# Patient Record
Sex: MALE | Employment: UNEMPLOYED | URBAN - METROPOLITAN AREA
[De-identification: names, ages, dates, MRNs, and addresses within clinical notes are randomized per-mention and may not be internally consistent; named-entity substitution may affect disease eponyms.]

---

## 2020-05-20 NOTE — PROGRESS NOTES
[facility] HIV TELECONFERENCE CLINIC NOTE     Date of Service: 5/20/2020    Referring Physician: TAYLA    Reason for Referral: Establish care for HIV, hepatitis C    Chief Complaint: HIV    History of Present Illness:     Denis Rolle is a 25 y.o. male with past medical history of IV drug abuse, asthma, HIV, here to establish care for HIV.  Patient states that he was diagnosed in 2015 when he was tested routinely in [place].  He states that he was started on Triumeq and has been on this since.  Patient feels that he likely contracted this from unprotected intercourse.  Patient has been taking Triumeq daily with no issues, no fevers or chills.    Patient has also reportedly been diagnosed with chronic hepatitis C with undetectable viral load.  No treatment attempted in the past.    Current ART: Triumeq  Prior HIV treatment: None  Resistance: Unknown  Diagnosed with HIV: 2015  Risk factors: Unprotected intercourse  HIV CD4 / viral load at start of treatment: Unknown  OIs: None known    Vaccines    There is no immunization history on file for this patient.    Pertinent Lab Results:    Date  CD4/%  HIV Viral Load  2/19/2020 302/16.8% <20  5/12/2020 314/16.5% pending    Screening:   HBV serologies:  HAV serology:  HCV Ab: positive  Serum RPR: Nonreactive 12/2019  Quant gold/PPD:  Urine GC/CT:  UA:    HCV genotype: 1a  HCV resistance:  Prior treatment status: Naïve  Risk factors: IV drug abuse  Cirrhosis: No fibrosis on fibro-sure  CTP score:  FIB-4 score: 0.75  HCV PCR at start of treatment: 5,00,000 4/3/2020  HBV serologies:  HAV serology:  HIV Ab: Known positive      LABS  Date  WBC  HGB  PLAT  2/19/2020 4.4  14.4  221  5/12/2020 4.5  15  196    Date  CR/BUN GFR  E-LYTES   2/19/2020 1.05  5/12/2020 0.93    Date  TBili  AlkPh  AST ALT Album  2/19/2020 0.3  84  60 122  5/12/2020 0.4  74  84 190    Lipids   Date  Chol Trig HDL VLDL LDL    HgbA1C  Date  HbA1c      Review of Systems:  All other systems reviewed and are  "negative expect as noted in HPI    Past medical history  As above    No past surgical history on file.    Family history  States not very close to family, unknown      Social History     Socioeconomic History   • Marital status: Unknown     Spouse name: Not on file   • Number of children: Not on file   • Years of education: Not on file   • Highest education level: Not on file   Occupational History   • Not on file   Social Needs   • Financial resource strain: Not on file   • Food insecurity     Worry: Not on file     Inability: Not on file   • Transportation needs     Medical: Not on file     Non-medical: Not on file   Tobacco Use   • Smoking status: Not on file   Substance and Sexual Activity   • Alcohol use: Not on file   • Drug use: Not on file   • Sexual activity: Not on file   Lifestyle   • Physical activity     Days per week: Not on file     Minutes per session: Not on file   • Stress: Not on file   Relationships   • Social connections     Talks on phone: Not on file     Gets together: Not on file     Attends Voodoo service: Not on file     Active member of club or organization: Not on file     Attends meetings of clubs or organizations: Not on file     Relationship status: Not on file   • Intimate partner violence     Fear of current or ex partner: Not on file     Emotionally abused: Not on file     Physically abused: Not on file     Forced sexual activity: Not on file   Other Topics Concern   • Not on file   Social History Narrative   • Not on file   History of IV drug abuse    Allergies   Allergen Reactions   • Penicillins      \" White splotches on skin as a child\" was told he almost .  He was also having an asthma exacerbation at that time.       Medications:  Triumeq  Bactrim     Physical Exam:   This consultation was conducted utilizing secure and encrypted videoconferencing equipment with the assistance of a trained tele-presenter at the originating site.     Vital Signs: /48 T 97.3 HR 47 " RR 18 o2 97% Wt 220.2  Vital signs reviewed  Constitutional: Patient is oriented to person, place, and time. Appears well-developed and well-nourished. No distress  Head: Atraumatic, normocephalic  Eyes: Conjunctivae normal, EOM intact.   Mouth/Throat: Lips without lesions, oropharynx is clear and moist.  Neck: Neck supple. No masses/lymphadenopathy  Cardiovascular: Normal rate, regular rhythm, normal S1S2 and intact distal pulses. No murmur, gallop, or friction rub. No pedal edema.  Pulmonary/Chest: No respiratory distress. Unlabored respiratory effort, lungs clear to auscultation. No wheezes or rales.   Abdominal: Soft, non tender. BS + x 4. No masses or hepatosplenomegaly.   Musculoskeletal: No joint tenderness, swelling, erythema, or restriction of motion noted.  Neurological: Alert and oriented to person, place, and time. No gross cranial nerve deficit. Skin: Skin is warm and dry. No rashes or embolic phenomena noted on exposed skin  Psychiatric: Normal mood and affect. Behavior is normal.     LABS:  No results found for: WBC, RBC, HEMOGLOBIN, HEMATOCRIT, MCV, MCH, MCHC, MPV  No results found for: SODIUM, POTASSIUM, CHLORIDE, CO2, GLUCOSE, BUN, CREATININE, BUNCREATRAT, GLOMRATE  No results found for: ALKPHOSPHAT, ASTSGOT, ALTSGPT, TBILIRUBIN   No results found for: CPKTOTAL     MICRO:  No results found for: BLOODCULTU, BLDCULT, BCHOLD      Latest pertinent labs were reviewed        Assessment:   Denis Rolle is a 25 y.o. male with a history of IV drug abuse, asthma, HIV, here to establish care for HIV and hepatitis C    Pertinent Diagnoses:  HIV disease  Hepatitis C  History of drug abuse    Plan:   HIV:  -Patient wishes to continue Triumeq 1 tab daily.  Will refill  -We have 2 readings with CD4 count more than 200 and CD4 percentage more than 14%.  Stop Bactrim  -Please check hepatitis A IgG  -Please check hepatitis B surface antigen, surface antibody, total core antibody  -Please check hepatitis C  antibody  -Please provide latest PPD skin test  -Please check UA  -Please check urine GC/CT NAAT  -If not already received, recommend Pneumococcal vaccination with PCV 13, followed by PPSV 23 at least 8 weeks later  -Please check fasting lipid panel  -Please check hemoglobin A1c    Hepatitis C:  -Please provide the hepatitis C viral load and genotype, fibro-sure results that were verbally provided  -No evidence of fibrosis on either FibroSure or the fib 4 score.  No liver ultrasound prior to hepatitis C therapy indicated  -Please obtain genotype 1 NS5A resistance testing ([facility] test no. [id number])  -Please obtain INR  -Please obtain above requested labs as well  -Once these labs are obtained, may be able to treat at next visit with either Zepatier or Epclusa    Return to clinic in 3 months with above labs as well as repeat CBC with differential, CMP, HIV viral load, CD4 count    Discussed with JENNIFER Doss M.D.    Please note that this dictation was created using voice recognition software. I have worked with technical experts from [facility] to optimize the interface.  I have made every reasonable attempt to correct obvious errors, but there may be errors of grammar and possibly content that I did not discover before finalizing the note.

## 2020-05-21 ENCOUNTER — TELEMEDICINE2 (OUTPATIENT)
Dept: VASCULAR LAB | Facility: MEDICAL CENTER | Age: 25
End: 2020-05-21
Attending: INTERNAL MEDICINE
Payer: OTHER GOVERNMENT

## 2020-05-21 DIAGNOSIS — B18.2 CHRONIC HEPATITIS C WITHOUT HEPATIC COMA (HCC): ICD-10-CM

## 2020-05-21 DIAGNOSIS — F19.11 HISTORY OF INTRAVENOUS DRUG ABUSE: ICD-10-CM

## 2020-05-21 DIAGNOSIS — B20 HIV DISEASE (HCC): ICD-10-CM

## 2020-05-21 PROCEDURE — 99204 OFFICE O/P NEW MOD 45 MIN: CPT | Performed by: INTERNAL MEDICINE

## 2021-02-24 ENCOUNTER — TELEMEDICINE2 (OUTPATIENT)
Dept: VASCULAR LAB | Facility: MEDICAL CENTER | Age: 26
End: 2021-02-24
Attending: INTERNAL MEDICINE
Payer: OTHER GOVERNMENT

## 2021-02-24 DIAGNOSIS — B20 HIV DISEASE (HCC): ICD-10-CM

## 2021-02-24 DIAGNOSIS — B18.2 CHRONIC HEPATITIS C WITHOUT HEPATIC COMA (HCC): ICD-10-CM

## 2021-02-24 DIAGNOSIS — F19.11 HISTORY OF INTRAVENOUS DRUG ABUSE: ICD-10-CM

## 2021-02-24 PROCEDURE — 99214 OFFICE O/P EST MOD 30 MIN: CPT | Performed by: INTERNAL MEDICINE

## 2021-02-24 RX ORDER — VELPATASVIR AND SOFOSBUVIR 100; 400 MG/1; MG/1
1 TABLET, FILM COATED ORAL DAILY
Qty: 84 TABLET | Refills: 0 | Status: SHIPPED | OUTPATIENT
Start: 2021-02-24

## 2021-02-24 RX ORDER — ABACAVIR SULFATE, DOLUTEGRAVIR SODIUM, LAMIVUDINE 600; 50; 300 MG/1; MG/1; MG/1
1 TABLET, FILM COATED ORAL DAILY
Qty: 30 TABLET | Refills: 11 | Status: SHIPPED | OUTPATIENT
Start: 2021-02-24

## 2021-02-24 NOTE — PROGRESS NOTES
[facility] HIV TELECONFERENCE CLINIC NOTE     Date of Service: 2/24/2021    Referring Physician: [facility]    Chief Complaint: Follow-up for HIV, hepatitis C    History of Present Illness:     This evaluation was conducted via [employer] using secure and encrypted videoconferencing technology. The patient was physically located at [facility] in [place], [place]. The patient was presented by a medical professional at the originating site.   The patient's identity was confirmed and verbal consent was obtained for this telemedicine encounter.      Denis Rolle is a 25 y.o. male with past medical history of IV drug abuse, asthma, HIV, here to establish care for HIV.  Patient states that he was diagnosed in 2015 when he was tested routinely in [place].  He states that he was started on Triumeq and has been on this since.  Patient feels that he likely contracted this from unprotected intercourse.  Patient has been taking Triumeq daily with no issues, no fevers or chills.  He has not missed any doses.     Patient has also reportedly been diagnosed with chronic hepatitis C with detectable viral load.  No treatment attempted in the past.     Current ART: Triumeq  Prior HIV treatment: None  Resistance: Unknown  Diagnosed with HIV: 2015  Risk factors: Unprotected intercourse  HIV CD4 / viral load at start of treatment: Unknown  OIs: None known     Vaccines     There is no immunization history on file for this patient.     Pertinent Lab Results:     Date                 CD4/%             HIV Viral Load  2/19/2020        302/16.8%       <20  5/12/2020        314/16.5%       pending  9/25/2020 406/16.9% <20  2/1/2021 288/18% <20     Screening:   HCV Ab: positive  Serum RPR: Nonreactive September 2020  PPD: negative September 2020  Urine GC/CT: Nonreactive September 2020  UA: No proteinuria September 2020     HCV genotype: 1a  HCV resistance:  Prior treatment status: Naïve  Risk factors: IV drug abuse  Cirrhosis:  No fibrosis on fibro-sure  CTP score: No INR but unlikely to be class B or C with other parameters  FIB-4 score: 0.75  HCV PCR at start of treatment: 5,00,000 4/3/2020  HBV serologies: Immune  HAV serology: Unknown  HIV Ab: Known positive      LABS  Date                 WBC                HGB                PLAT  2/19/2020        4.4                   14.4                 221  5/12/2020        4.5                   15                    196  9/25/2020 5.0  15.8  237  2/1/2021 4.7  15.7  231     Date                 CR/BUN          GFR                 E-LYTES           2/19/2020        1.05  5/12/2020        0.93  9/24/2020 0.95  2/1/2021 0.84     Date                 TBili                 AlkPh               AST     ALT      Album  2/19/2020        0.3                   84                    60        122  5/12/2020        0.4                   74                    84        190  9/24/2020 0.4  95  48 107  2/1/2021 0.4  79  41 74     Lipids   Date                 Chol     Trig      HDL     VLDL   LDL  9/24/2020 122 119 37 22 63     HgbA1C  Date                 HbA1c  9/24/2020 5.4%    Review of Systems:  All other systems reviewed and are negative expect as noted in HPI    Past medical history  As above    Family history  States not very close to family, unknown    Social history  History of IV drug abuse    Social History     Socioeconomic History   • Marital status: Unknown     Spouse name: Not on file   • Number of children: Not on file   • Years of education: Not on file   • Highest education level: Not on file   Occupational History   • Not on file   Tobacco Use   • Smoking status: Not on file   Substance and Sexual Activity   • Alcohol use: Not on file   • Drug use: Not on file   • Sexual activity: Not on file   Other Topics Concern   • Not on file   Social History Narrative   • Not on file     Social Determinants of Health     Financial Resource Strain:    • Difficulty of Paying Living Expenses:    Food  "Insecurity:    • Worried About Running Out of Food in the Last Year:    • Ran Out of Food in the Last Year:    Transportation Needs:    • Lack of Transportation (Medical):    • Lack of Transportation (Non-Medical):    Physical Activity:    • Days of Exercise per Week:    • Minutes of Exercise per Session:    Stress:    • Feeling of Stress :    Social Connections:    • Frequency of Communication with Friends and Family:    • Frequency of Social Gatherings with Friends and Family:    • Attends Druze Services:    • Active Member of Clubs or Organizations:    • Attends Club or Organization Meetings:    • Marital Status:    Intimate Partner Violence:    • Fear of Current or Ex-Partner:    • Emotionally Abused:    • Physically Abused:    • Sexually Abused:        Allergies   Allergen Reactions   • Penicillins      \" White splotches on skin as a child\" was told he almost .  He was also having an asthma exacerbation at that time.       Medications:  Triumeq    Physical Exam:   This consultation was conducted utilizing secure and encrypted videoconferencing equipment with the assistance of a trained tele-presenter at the originating site.     Vital Signs: Wt 225 /69 T 96.5 HR 55 RR 16 o2 98%  Vital signs reviewed  Constitutional: Patient is oriented to person, place, and time. Appears well-developed and well-nourished. No distress  Head: Atraumatic, normocephalic  Eyes: Conjunctivae normal, EOM intact   Mouth/Throat: Wearing a mask  Neck: Neck supple. No masses/lymphadenopathy  Cardiovascular: Normal rate, regular rhythm, normal S1S2 and intact distal pulses. No murmur, gallop, or friction rub. No pedal edema.  Pulmonary/Chest: No respiratory distress. Unlabored respiratory effort, lungs clear to auscultation. No wheezes or rales.   Abdominal: Soft, non tender. No masses or hepatosplenomegaly.   Musculoskeletal: No joint tenderness, swelling, erythema, or restriction of motion noted.  Neurological: Alert and " oriented to person, place, and time. No focal neural deficit noted  Skin: Skin is warm and dry. No rashes or embolic phenomena noted on exposed skin  Psychiatric: Pleasant.  Normal mood and affect. Behavior is normal.     LABS:  No results found for: WBC, RBC, HEMOGLOBIN, HEMATOCRIT, MCV, MCH, MCHC, MPV  No results found for: SODIUM, POTASSIUM, CHLORIDE, CO2, GLUCOSE, BUN, CREATININE, BUNCREATRAT, GLOMRATE  No results found for: ALKPHOSPHAT, ASTSGOT, ALTSGPT, TBILIRUBIN   No results found for: CPKTOTAL     MICRO:  No results found for: BLOODCULTU, BLDCULT, BCHOLD      Latest pertinent labs were reviewed      Assessment:   Denis Rolle is a pleasant 25 y.o. male with a history of IV drug abuse, asthma, HIV, here to establish care for HIV and hepatitis C     Pertinent Diagnoses:  HIV disease  Hepatitis C  History of drug abuse     Plan:   HIV:  -Patient wishes to continue Triumeq 1 tab daily.  Will refill  -Bactrim discontinued last visit due to multiple readings with CD4 count more than 200 and CD4 percentage more than 14%  -Please check hepatitis A IgG. Multiple IgMs have been checked  -If not already received, recommend Pneumococcal vaccination with PCV 13, followed by PPSV 23 at least 8 weeks later     Hepatitis C:  -Please provide the hepatitis C viral load and genotype, fibro-sure results that were verbally provided  -No evidence of fibrosis on either FibroSure or the fib 4 score.  No liver ultrasound prior to hepatitis C therapy indicated  -Please obtain INR  -Start PO Epclusa 1 tab daily x 12 weeks  -Medication interactions: None significant  -4 weeks from start of therapy: Recommend evaluation by facility care provider - to assess for tolerance and/or side effects. If any concerns such as fever, jaundice, scleral icterus, abdominal pain, abdominal distention, nausea, vomiting or diarrhea or other, then obtain CMP (note: A 10x increase in ALT at any time should prompt immediate discontinuation of therapy, if  ALT rising then test q2 weeks) and notify [facility]  -At any time during therapy if there are concerning signs/symptoms then obtain the above labs and notify [facility]  -If NO concerns then continue treatment and no additional labs are needed until after treatment is complete   -12 weeks after COMPLETION of  therapy - obtain Hepatitis C virus (HCV) quantitative PCR ([facility] test number [id number])     Repeat CBC with differential, CMP, HIV viral load, CD4 count and above labs in 3 months     Discussed with JENNIFER Doss M.D.    Please note that this dictation was created using voice recognition software. I have worked with technical [facility] to optimize the interface.  I have made every reasonable attempt to correct obvious errors, but there may be errors of grammar and possibly content that I did not discover before finalizing the note.

## 2021-02-25 PROCEDURE — RXMED WILLOW AMBULATORY MEDICATION CHARGE: Performed by: INTERNAL MEDICINE

## 2021-02-27 PROCEDURE — RXMED WILLOW AMBULATORY MEDICATION CHARGE: Performed by: INTERNAL MEDICINE

## 2021-03-01 ENCOUNTER — PHARMACY VISIT (OUTPATIENT)
Dept: PHARMACY | Facility: MEDICAL CENTER | Age: 26
End: 2021-03-01
Payer: OTHER GOVERNMENT

## 2021-03-25 PROCEDURE — RXMED WILLOW AMBULATORY MEDICATION CHARGE: Performed by: INTERNAL MEDICINE

## 2021-03-26 ENCOUNTER — PHARMACY VISIT (OUTPATIENT)
Dept: PHARMACY | Facility: MEDICAL CENTER | Age: 26
End: 2021-03-26
Payer: OTHER GOVERNMENT

## 2021-04-15 ENCOUNTER — PHARMACY VISIT (OUTPATIENT)
Dept: PHARMACY | Facility: MEDICAL CENTER | Age: 26
End: 2021-04-15
Payer: OTHER GOVERNMENT

## 2021-04-15 PROCEDURE — RXMED WILLOW AMBULATORY MEDICATION CHARGE: Performed by: INTERNAL MEDICINE
